# Patient Record
Sex: MALE | Race: WHITE | NOT HISPANIC OR LATINO | Employment: UNEMPLOYED | URBAN - METROPOLITAN AREA
[De-identification: names, ages, dates, MRNs, and addresses within clinical notes are randomized per-mention and may not be internally consistent; named-entity substitution may affect disease eponyms.]

---

## 2024-01-25 DIAGNOSIS — M54.2 CERVICALGIA: Primary | ICD-10-CM

## 2024-02-23 ENCOUNTER — CLINICAL SUPPORT (OUTPATIENT)
Dept: REHABILITATION | Facility: HOSPITAL | Age: 39
End: 2024-02-23
Payer: OTHER GOVERNMENT

## 2024-02-23 DIAGNOSIS — M54.2 CERVICALGIA: Primary | ICD-10-CM

## 2024-02-23 PROCEDURE — 97811 ACUP 1/> W/O ESTIM EA ADD 15: CPT | Mod: PO

## 2024-02-23 PROCEDURE — 97810 ACUP 1/> WO ESTIM 1ST 15 MIN: CPT | Mod: PO

## 2024-03-02 PROBLEM — M54.2 CERVICALGIA: Status: ACTIVE | Noted: 2024-03-02

## 2024-03-03 NOTE — PROGRESS NOTES
Acupuncture Evaluation Note     Name: Javan Ozuna  Clinic Number: [id number]    Traditional Chinese Medicine (TCM) Diagnosis: Qi Stagnation  Medical Diagnosis:   Encounter Diagnosis   Name Primary?    Cervicalgia Yes        Evaluation Date: 2/23/24    Visit #/Visits authorized: 1/12  Precautions: Standard    Subjective     Chief Concern: Cervicalgia, pain of the joints    Medical necessity is demonstrated by the following IMPAIRMENTS: Medical Necessity: Decreased mobility limits day to day activities, social, and emergent situations              Aggravating Factors:  movement and pressure   Relieving Factors:  rest    Symptom Description:     Quality:  Aching  Severity:  5  Frequency:  when active      Objective       Pulse:        wiry         Treatment     Treatment Principles:Cervicalgia pain of neck and chronic low back pain; joints      Acupuncture points used:  K3, UB 62, SI4/SI3, TB 5, GB 20   Needles In: 10  Needles Out: 10  Needles W/O STIM placed: 11:00am  Needles W/O STIM removed: 11:45am        Assessment     After treatment, patient felt more relaxed and less stressed      Patient prognosis is Good.     Patient will continue to benefit from acupuncture treatment to address the deficits listed in the problem list box on initial evaluation, provide patient family education and to maximize pt's level of independence in the home and community environment.     Patient's spiritual, cultural and educational needs considered and pt agreeable to plan of care and goals.     Anticipated barriers to treatment: none    Plan     Recommend every couple weeks for 12 sessions with midway re-assess.      Education:  Patient is aware of cumulative benefit of acupuncture

## 2024-03-08 ENCOUNTER — CLINICAL SUPPORT (OUTPATIENT)
Dept: REHABILITATION | Facility: HOSPITAL | Age: 39
End: 2024-03-08
Payer: OTHER GOVERNMENT

## 2024-03-08 DIAGNOSIS — M54.2 CERVICALGIA: Primary | ICD-10-CM

## 2024-03-08 PROCEDURE — 97811 ACUP 1/> W/O ESTIM EA ADD 15: CPT | Mod: PO

## 2024-03-08 PROCEDURE — 97810 ACUP 1/> WO ESTIM 1ST 15 MIN: CPT | Mod: PO

## 2024-03-11 NOTE — PROGRESS NOTES
Acupuncture Evaluation Note     Name: Javan Ozuna  Clinic Number: [id number]    Traditional Chinese Medicine (TCM) Diagnosis: Qi Stagnation  Medical Diagnosis:   Encounter Diagnosis   Name Primary?    Cervicalgia Yes        Evaluation Date: 3/8/24    Visit #/Visits authorized: 2/12  Precautions: Standard    Subjective     Chief Concern: Cervicalgia, pain of the joints    Medical necessity is demonstrated by the following IMPAIRMENTS: Medical Necessity: Decreased mobility limits day to day activities, social, and emergent situations              Aggravating Factors:  movement and pressure   Relieving Factors:  rest    Symptom Description:     Quality:  Aching  Severity:  5  Frequency:  when active      Objective       Pulse:        wiry         Treatment     Treatment Principles:Cervicalgia pain of neck and chronic low back pain; joints      Acupuncture points used:  K3, UB 62, SI4/SI3, TB 5, GB 20   Needles In: 10  Needles Out: 10  Needles W/O STIM placed: 1:30pm  [place] W/O STIM removed: 2:15pm        Assessment     After treatment, patient felt more relaxed and less stressed      Patient prognosis is Good.     Patient will continue to benefit from acupuncture treatment to address the deficits listed in the problem list box on initial evaluation, provide patient family education and to maximize pt's level of independence in the home and community environment.     Patient's spiritual, cultural and educational needs considered and pt agreeable to plan of care and goals.     Anticipated barriers to treatment: none    Plan     Recommend every couple weeks for 12 sessions with midway re-assess.      Education:  Patient is aware of cumulative benefit of acupuncture

## 2024-03-22 ENCOUNTER — CLINICAL SUPPORT (OUTPATIENT)
Dept: REHABILITATION | Facility: HOSPITAL | Age: 39
End: 2024-03-22
Payer: OTHER GOVERNMENT

## 2024-03-22 DIAGNOSIS — M54.2 CERVICALGIA: Primary | ICD-10-CM

## 2024-03-22 PROCEDURE — 97810 ACUP 1/> WO ESTIM 1ST 15 MIN: CPT | Mod: PO

## 2024-03-22 PROCEDURE — 97811 ACUP 1/> W/O ESTIM EA ADD 15: CPT | Mod: PO

## 2024-03-25 NOTE — PROGRESS NOTES
Acupuncture Evaluation Note     Name: Javan Ozuna  Clinic Number: [id number]    Traditional Chinese Medicine (TCM) Diagnosis: Qi Stagnation  Medical Diagnosis:   Encounter Diagnosis   Name Primary?    Cervicalgia Yes        Evaluation Date: 3/22/24    Visit #/Visits authorized: 3/12  Precautions: Standard    Subjective     Chief Concern: Cervicalgia, pain of the joints    Medical necessity is demonstrated by the following IMPAIRMENTS: Medical Necessity: Decreased mobility limits day to day activities, social, and emergent situations              Aggravating Factors:  movement and pressure   Relieving Factors:  rest    Symptom Description:     Quality:  Aching  Severity:  5  Frequency:  when active      Objective       Pulse:        wiry         Treatment     Treatment Principles:Cervicalgia pain of neck and chronic low back pain; joints      Acupuncture points used:  K3, UB 62, SI4/SI3, TB 5, GB 20   Needles In: 10  Needles Out: 10  Needles W/O STIM placed: 1:30pm  [place] W/O STIM removed: 2:15pm        Assessment     After treatment, patient felt more relaxed and less stressed      Patient prognosis is Good.     Patient will continue to benefit from acupuncture treatment to address the deficits listed in the problem list box on initial evaluation, provide patient family education and to maximize pt's level of independence in the home and community environment.     Patient's spiritual, cultural and educational needs considered and pt agreeable to plan of care and goals.     Anticipated barriers to treatment: none    Plan     Recommend every couple weeks for 12 sessions with midway re-assess.      Education:  Patient is aware of cumulative benefit of acupuncture

## 2024-04-05 ENCOUNTER — CLINICAL SUPPORT (OUTPATIENT)
Dept: REHABILITATION | Facility: HOSPITAL | Age: 39
End: 2024-04-05
Payer: OTHER GOVERNMENT

## 2024-04-05 DIAGNOSIS — M54.2 CERVICALGIA: Primary | ICD-10-CM

## 2024-04-05 PROCEDURE — 97810 ACUP 1/> WO ESTIM 1ST 15 MIN: CPT | Mod: PO

## 2024-04-05 PROCEDURE — 97811 ACUP 1/> W/O ESTIM EA ADD 15: CPT | Mod: PO

## 2024-04-08 NOTE — PROGRESS NOTES
Acupuncture Evaluation Note     Name: Javan Ozuna  Clinic Number: [id number]    Traditional Chinese Medicine (TCM) Diagnosis: Qi Stagnation  Medical Diagnosis:   Encounter Diagnosis   Name Primary?    Cervicalgia Yes        Evaluation Date: 4/5/24    Visit #/Visits authorized: 4/12  Precautions: Standard    Subjective     Chief Concern: Cervicalgia, pain of the joints    Medical necessity is demonstrated by the following IMPAIRMENTS: Medical Necessity: Decreased mobility limits day to day activities, social, and emergent situations              Aggravating Factors:  movement and pressure   Relieving Factors:  rest    Symptom Description:     Quality:  Aching  Severity:  5  Frequency:  when active      Objective       Pulse:        wiry         Treatment     Treatment Principles:Cervicalgia pain of neck and chronic low back pain; joints      Acupuncture points used:  K3, UB 62, SI4/SI3, TB 5, GB 20   Needles In: 10  Needles Out: 10  Needles W/O STIM placed: 10am  [place] W/O STIM removed:10:45am        Assessment     After treatment, patient felt more relaxed and less stressed      Patient prognosis is Good.     Patient will continue to benefit from acupuncture treatment to address the deficits listed in the problem list box on initial evaluation, provide patient family education and to maximize pt's level of independence in the home and community environment.     Patient's spiritual, cultural and educational needs considered and pt agreeable to plan of care and goals.     Anticipated barriers to treatment: none    Plan     Recommend every couple weeks for 12 sessions with midway re-assess.      Education:  Patient is aware of cumulative benefit of acupuncture

## 2024-04-26 ENCOUNTER — CLINICAL SUPPORT (OUTPATIENT)
Dept: REHABILITATION | Facility: HOSPITAL | Age: 39
End: 2024-04-26
Payer: OTHER GOVERNMENT

## 2024-04-26 DIAGNOSIS — M54.2 CERVICALGIA: Primary | ICD-10-CM

## 2024-04-26 PROCEDURE — 97810 ACUP 1/> WO ESTIM 1ST 15 MIN: CPT | Mod: PO

## 2024-04-26 PROCEDURE — 97811 ACUP 1/> W/O ESTIM EA ADD 15: CPT | Mod: PO

## 2024-04-28 NOTE — PROGRESS NOTES
Acupuncture Evaluation Note     Name: Javan Ozuna  Clinic Number: [id number]    Traditional Chinese Medicine (TCM) Diagnosis: Qi Stagnation  Medical Diagnosis:   Encounter Diagnosis   Name Primary?    Cervicalgia Yes        Evaluation Date: 4/26/24    Visit #/Visits authorized: 5/12  Precautions: Standard    Subjective     Chief Concern: Cervicalgia, pain of the joints    Medical necessity is demonstrated by the following IMPAIRMENTS: Medical Necessity: Decreased mobility limits day to day activities, social, and emergent situations              Aggravating Factors:  movement and pressure   Relieving Factors:  rest    Symptom Description:     Quality:  Aching  Severity:  5  Frequency:  when active      Objective       Pulse:        wiry         Treatment     Treatment Principles:Cervicalgia pain of neck and chronic low back pain; joints      Acupuncture points used:  K3, UB 62, SI4/SI3, TB 5, GB 20   Needles In: 10  Needles Out: 10  Needles W/O STIM placed: 8am  [place] W/O STIM removed:8:45am        Assessment     After treatment, patient felt more relaxed and less stressed      Patient prognosis is Good.     Patient will continue to benefit from acupuncture treatment to address the deficits listed in the problem list box on initial evaluation, provide patient family education and to maximize pt's level of independence in the home and community environment.     Patient's spiritual, cultural and educational needs considered and pt agreeable to plan of care and goals.     Anticipated barriers to treatment: none    Plan     Recommend every couple weeks for 12 sessions with midway re-assess.      Education:  Patient is aware of cumulative benefit of acupuncture

## 2024-05-31 ENCOUNTER — CLINICAL SUPPORT (OUTPATIENT)
Dept: REHABILITATION | Facility: HOSPITAL | Age: 39
End: 2024-05-31
Payer: OTHER GOVERNMENT

## 2024-05-31 DIAGNOSIS — M54.2 CERVICALGIA: Primary | ICD-10-CM

## 2024-05-31 PROCEDURE — 97811 ACUP 1/> W/O ESTIM EA ADD 15: CPT | Mod: PO

## 2024-05-31 PROCEDURE — 97810 ACUP 1/> WO ESTIM 1ST 15 MIN: CPT | Mod: PO

## 2024-05-31 NOTE — PROGRESS NOTES
Acupuncture Evaluation Note     Name: Javan Ozuna  Clinic Number: [id number]    Traditional Chinese Medicine (TCM) Diagnosis: Qi Stagnation  Medical Diagnosis:   Encounter Diagnosis   Name Primary?    Cervicalgia Yes        Evaluation Date: 5/31/24    Visit #/Visits authorized: 6/12  Precautions: Standard    Subjective     Chief Concern: Cervicalgia, pain of the joints    Medical necessity is demonstrated by the following IMPAIRMENTS: Medical Necessity: Decreased mobility limits day to day activities, social, and emergent situations              Aggravating Factors:  movement and pressure   Relieving Factors:  rest    Symptom Description:     Quality:  Aching  Severity:  5  Frequency:  when active      Objective       Pulse:        wiry         Treatment     Treatment Principles:Cervicalgia pain of neck and chronic low back pain; joints      Acupuncture points used:  K3, UB 62, SI4/SI3, TB 5, GB 20   Needles In: 10  Needles Out: 10  Needles W/O STIM placed: 7:30am  [place] W/O STIM removed:8:15am        Assessment     After treatment, patient felt more relaxed and less stressed      Patient prognosis is Good.     Patient will continue to benefit from acupuncture treatment to address the deficits listed in the problem list box on initial evaluation, provide patient family education and to maximize pt's level of independence in the home and community environment.     Patient's spiritual, cultural and educational needs considered and pt agreeable to plan of care and goals.     Anticipated barriers to treatment: none    Plan     Recommend every couple weeks for 12 sessions with midway re-assess.      Education:  Patient is aware of cumulative benefit of acupuncture

## 2025-02-24 DIAGNOSIS — L98.7 EXCESSIVE AND REDUNDANT SKIN AND SUBCUTANEOUS TISSUE: Primary | ICD-10-CM
